# Patient Record
Sex: MALE | Race: BLACK OR AFRICAN AMERICAN | Employment: FULL TIME | URBAN - METROPOLITAN AREA
[De-identification: names, ages, dates, MRNs, and addresses within clinical notes are randomized per-mention and may not be internally consistent; named-entity substitution may affect disease eponyms.]

---
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
## 2022-03-03 ENCOUNTER — OFFICE VISIT (OUTPATIENT)
Dept: PRIMARY CARE CLINIC | Age: 22
End: 2022-03-03
Payer: COMMERCIAL

## 2022-03-03 VITALS
TEMPERATURE: 98 F | BODY MASS INDEX: 33.62 KG/M2 | RESPIRATION RATE: 18 BRPM | WEIGHT: 227 LBS | SYSTOLIC BLOOD PRESSURE: 129 MMHG | HEIGHT: 69 IN | HEART RATE: 67 BPM | DIASTOLIC BLOOD PRESSURE: 84 MMHG

## 2022-03-03 DIAGNOSIS — Z11.3 SCREENING EXAMINATION FOR STD (SEXUALLY TRANSMITTED DISEASE): ICD-10-CM

## 2022-03-03 DIAGNOSIS — Z00.00 ANNUAL PHYSICAL EXAM: Primary | ICD-10-CM

## 2022-03-03 DIAGNOSIS — E55.9 VITAMIN D DEFICIENCY: ICD-10-CM

## 2022-03-03 PROCEDURE — 99385 PREV VISIT NEW AGE 18-39: CPT | Performed by: INTERNAL MEDICINE

## 2022-03-03 NOTE — PROGRESS NOTES
Hannah Clemente is a 22 y.o.  male and presents with     Chief Complaint   Patient presents with    New Patient   [address] [facility] [address]     requests STD testing today     Pt is here to [facility]. Pt is currently working for [employer] a job. Does not smoke or drink. Mother has HTN. DM runs in the family. [facility]  [facility]. Pt kissed someone but did not have intercourse . 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 Past Medical History:   Diagnosis Date    Asthma     Community acquired pneumonia     Other unknown and unspecified cause of morbidity or mortality     rsv     History reviewed. No pertinent surgical history. No current outpatient medications on file. No current facility-administered medications for this visit. Health Maintenance   Topic Date Due    Hepatitis C Screening  Never done    Depression Screen  Never done    COVID-19 Vaccine (1) Never done    HPV Age 9Y-34Y (3 - Male 2-dose series) Never done    DTaP/Tdap/Td series (2 - Td or Tdap) 10/21/2012    Flu Vaccine (1) Never done    Hepatitis A Peds Age 1-18  Aged Out    Pneumococcal 0-64 years  Aged [employer] History   Administered Date(s) Administered    Tdap 09/23/2012     No LMP for male patient. Allergies and Intolerances:   No Known Allergies    Family History:   Family History   Problem Relation Age of Onset    Hypertension Mother        Social History:   He  reports that he has never smoked. He has never used smokeless tobacco.  He  reports no history of alcohol use.             Review of Systems:   General: negative for - chills, fatigue, fever, weight change  Psych: negative for - anxiety, depression, irritability or mood swings  ENT: negative for - headaches, hearing change, nasal congestion, oral lesions, sneezing or sore throat  Heme/ Lymph: negative for - bleeding problems, bruising, pallor or swollen lymph nodes  Endo: negative for - hot flashes, polydipsia/polyuria or temperature intolerance  Resp: negative for - cough, shortness of breath or wheezing  CV: negative for - chest pain, edema or palpitations  GI: negative for - abdominal pain, change in bowel habits, constipation, diarrhea or nausea/vomiting  : negative for - dysuria, hematuria, incontinence, pelvic pain or vulvar/vaginal symptoms  MSK: negative for - joint pain, joint swelling or muscle pain  Neuro: negative for - confusion, headaches, seizures or weakness  Derm: negative for - dry skin, hair changes, rash or skin lesion changes          Physical:   Vitals:   Vitals:    03/03/22 0825 03/03/22 0828   BP: (!) 154/84 129/84   Pulse: 64 67   Resp: 18    Temp: 98 °F (36.7 °C)    TempSrc: Temporal    Weight: 227 lb (103 kg)    Height: 5' 9\" (1.753 m)            Exam:   HEENT- atraumatic,normocephalic, awake, oriented, well nourished  Neck - supple,no enlarged lymph nodes, no JVD, no thyromegaly  Chest- CTA, no rhonchi, no crackles  Heart- rrr, no murmurs / gallop/rub  Abdomen- soft,BS+,NT, no hepatosplenomegaly  Ext - no c/c/edema   Neuro- no focal deficits. Power 5/5 all extremities  Skin - warm,dry, no obvious rashes. Review of Data:   LABS:   No results found for: WBC, HGB, HCT, PLT, HGBEXT, HCTEXT, PLTEXT, HGBEXT, HCTEXT, PLTEXT  No results found for: NA, K, CL, CO2, GLU, BUN, CREA  No results found for: CHOL, CHOLX, CHLST, CHOLV, HDL, HDLP, LDL, LDLC, DLDLP, TGLX, TRIGL, TRIGP  No components found for: GPT        Impression / Plan:        ICD-10-CM ICD-9-CM    1. Annual physical exam  Z00.00 V70.0 CBC WITH AUTOMATED DIFF      METABOLIC PANEL, COMPREHENSIVE      LIPID PANEL   2. Screening examination for STD (sexually transmitted disease)  Z11.3 V74.5 HSV 1 AND 2-SPEC AB, IGG W/RFX TO SUPPL HSV-2 TESTING      HSV 1/2 AB, IGM   3. Vitamin D deficiency  E55.9 268.9 VITAMIN D, 25 HYDROXY       Explained to patient risk benefits of the medications. Advised patient to stop meds if having any side effects. Pt verbalized understanding of the instructions. I have discussed the diagnosis with the patient and the intended plan as seen in the above orders. The patient has received an after-visit summary and questions were answered concerning future plans. I have discussed medication side effects and warnings with the patient as well. I have reviewed the plan of care with the patient, accepted their input and they are in agreement with the treatment goals. Reviewed plan of care. Patient has provided input and agrees with goals. Follow-up and Dispositions    · Return in about 1 year (around 3/3/2023).          Jackie Faria MD

## 2022-03-03 NOTE — PROGRESS NOTES
Identified pt with two pt identifiers(name and ). Chief Complaint   Patient presents with   [address]   [address]     requests STD testing today      Pt not sexually active, but went to [facility] a couple of weeks ago for STD testing after making out with someone, which he later regretted. Pt reports he became paranoid and wanted a full STD/STI panel. 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 He denies genital warts, sores, or lesions. Pt denies cold sores. He would like to know how/why he has HSV-2 and if there's anything he can do for this. 3 most recent PHQ Screens 3/3/2022   Little interest or pleasure in doing things Not at all   Feeling down, depressed, irritable, or hopeless Not at all   Total Score PHQ 2 0        Vitals:    22 0825 22 0828   BP: (!) 154/84 129/84   Pulse: 64 67   Resp: 18    Temp: 98 °F (36.7 °C)    TempSrc: Temporal    Weight: 227 lb (103 kg)    Height: 5' 9\" (1.753 m)    PainSc:   0 - No pain        Health Maintenance Due   Topic    Hepatitis C Screening     Depression Screen     COVID-19 Vaccine (1)    HPV Age 9Y-34Y (1 - Male 2-dose series)    DTaP/Tdap/Td series (2 - Td or Tdap)    Flu Vaccine (1)       1. Have you been to the ER, urgent care clinic since your last visit? Hospitalized since your last visit? [facility] a couple of weeks ago for STD testing    2. Have you seen or consulted any other health care providers outside of the [address] [place] [address] since your last visit? Include any pap smears or colon screening.  No

## 2022-03-06 DIAGNOSIS — E55.9 VITAMIN D DEFICIENCY: Primary | ICD-10-CM

## 2022-03-06 RX ORDER — MELATONIN
1000 DAILY
Qty: 90 TABLET | Refills: 1 | Status: SHIPPED | OUTPATIENT
Start: 2022-03-06